# Patient Record
Sex: FEMALE | URBAN - METROPOLITAN AREA
[De-identification: names, ages, dates, MRNs, and addresses within clinical notes are randomized per-mention and may not be internally consistent; named-entity substitution may affect disease eponyms.]

---

## 2022-02-10 ENCOUNTER — NURSE TRIAGE (OUTPATIENT)
Dept: ADMINISTRATIVE | Facility: CLINIC | Age: 22
End: 2022-02-10

## 2022-02-10 NOTE — TELEPHONE ENCOUNTER
Caller is in [place]. Advised to call a local nurse line or go to urgent care or ED for medical assistance. Caller verbalized understanding.